# Patient Record
Sex: FEMALE | Race: ASIAN | NOT HISPANIC OR LATINO | URBAN - METROPOLITAN AREA
[De-identification: names, ages, dates, MRNs, and addresses within clinical notes are randomized per-mention and may not be internally consistent; named-entity substitution may affect disease eponyms.]

---

## 2014-11-07 RX ORDER — CARBIDOPA AND LEVODOPA 25; 100 MG/1; MG/1
2 TABLET ORAL
Qty: 0 | Refills: 0 | DISCHARGE
Start: 2014-11-07

## 2017-01-15 ENCOUNTER — EMERGENCY (EMERGENCY)
Facility: HOSPITAL | Age: 67
LOS: 1 days | Discharge: HOME | End: 2017-01-15
Attending: EMERGENCY MEDICINE | Admitting: EMERGENCY MEDICINE
Payer: COMMERCIAL

## 2017-01-15 VITALS
SYSTOLIC BLOOD PRESSURE: 156 MMHG | RESPIRATION RATE: 20 BRPM | OXYGEN SATURATION: 100 % | HEART RATE: 88 BPM | DIASTOLIC BLOOD PRESSURE: 86 MMHG

## 2017-01-15 VITALS
DIASTOLIC BLOOD PRESSURE: 76 MMHG | HEART RATE: 78 BPM | SYSTOLIC BLOOD PRESSURE: 130 MMHG | TEMPERATURE: 98 F | OXYGEN SATURATION: 99 % | RESPIRATION RATE: 16 BRPM

## 2017-01-15 DIAGNOSIS — R07.89 OTHER CHEST PAIN: ICD-10-CM

## 2017-01-15 PROCEDURE — 73562 X-RAY EXAM OF KNEE 3: CPT | Mod: 26,LT

## 2017-01-15 PROCEDURE — 71046 X-RAY EXAM CHEST 2 VIEWS: CPT

## 2017-01-15 PROCEDURE — 99284 EMERGENCY DEPT VISIT MOD MDM: CPT | Mod: 25

## 2017-01-15 PROCEDURE — 73562 X-RAY EXAM OF KNEE 3: CPT

## 2017-01-15 PROCEDURE — 93005 ELECTROCARDIOGRAM TRACING: CPT

## 2017-01-15 PROCEDURE — 71120 X-RAY EXAM BREASTBONE 2/>VWS: CPT

## 2017-01-15 PROCEDURE — 71120 X-RAY EXAM BREASTBONE 2/>VWS: CPT | Mod: 26

## 2017-01-15 PROCEDURE — 71020: CPT | Mod: 26

## 2017-01-15 PROCEDURE — 93010 ELECTROCARDIOGRAM REPORT: CPT

## 2017-01-15 RX ORDER — ACETAMINOPHEN 500 MG
975 TABLET ORAL ONCE
Qty: 0 | Refills: 0 | Status: COMPLETED | OUTPATIENT
Start: 2017-01-15 | End: 2017-01-15

## 2017-01-15 RX ORDER — OXYCODONE HYDROCHLORIDE 5 MG/1
1 TABLET ORAL
Qty: 10 | Refills: 0
Start: 2017-01-15 | End: 2017-01-18

## 2017-01-15 RX ADMIN — Medication 975 MILLIGRAM(S): at 15:36
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
## 2017-01-15 NOTE — ED PROVIDER NOTE - ATTENDING CONTRIBUTION TO CARE
. I performed a history and physical exam of the patient and discussed their management with the advanced care provider. I reviewed the advanced care provider's note and agree with the documented findings and plan of care. My medical decision making and objective findings are found above.
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
## 2017-01-15 NOTE — ED ADULT NURSE NOTE - CHPI ED SYMPTOMS NEG
no back pain/no dizziness/no cough/no vomiting/no syncope/no diaphoresis/no chills/no fever/no nausea

## 2017-01-15 NOTE — ED ADULT NURSE NOTE - PSH
H/O abdominal hysterectomy  fibroids - 1992  S/P brain surgery  with brain stimulators placed 8/22/2013

## 2017-01-15 NOTE — ED PROVIDER NOTE - PROGRESS NOTE DETAILS
Pt signed out to me. reexamined at bedside, EKG wnl, XRs reviewed. Her stimulator obscures part of sternal xray. suspicion for fx low. She has no ecchymosis over sternum. She does had mild lower thoracic pain without much tenderness I offered a CT chest and explained risks/benefits. Pt declined Her and family understand to return if SOB, increased pain, etc.

## 2017-01-15 NOTE — ED PROVIDER NOTE - INTERPRETATION
normal sinus rhythm, Normal axis, Normal AZ interval and QRS complex. There are no acute ischemic ST or T-wave changes.
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
## 2017-01-23 ENCOUNTER — OUTPATIENT (OUTPATIENT)
Dept: OUTPATIENT SERVICES | Facility: HOSPITAL | Age: 67
LOS: 1 days | End: 2017-01-23
Payer: COMMERCIAL

## 2017-01-23 ENCOUNTER — APPOINTMENT (OUTPATIENT)
Dept: CT IMAGING | Facility: IMAGING CENTER | Age: 67
End: 2017-01-23

## 2017-01-23 DIAGNOSIS — Z00.8 ENCOUNTER FOR OTHER GENERAL EXAMINATION: ICD-10-CM

## 2017-01-23 PROCEDURE — 71250 CT THORAX DX C-: CPT

## 2018-06-18 ENCOUNTER — APPOINTMENT (OUTPATIENT)
Dept: DERMATOLOGY | Facility: CLINIC | Age: 68
End: 2018-06-18
Payer: COMMERCIAL

## 2018-06-18 ENCOUNTER — LABORATORY RESULT (OUTPATIENT)
Age: 68
End: 2018-06-18

## 2018-06-18 VITALS
SYSTOLIC BLOOD PRESSURE: 140 MMHG | BODY MASS INDEX: 28.16 KG/M2 | WEIGHT: 153 LBS | DIASTOLIC BLOOD PRESSURE: 80 MMHG | HEIGHT: 62 IN

## 2018-06-18 DIAGNOSIS — Z86.39 PERSONAL HISTORY OF OTHER ENDOCRINE, NUTRITIONAL AND METABOLIC DISEASE: ICD-10-CM

## 2018-06-18 DIAGNOSIS — Z87.09 PERSONAL HISTORY OF OTHER DISEASES OF THE RESPIRATORY SYSTEM: ICD-10-CM

## 2018-06-18 DIAGNOSIS — D48.5 NEOPLASM OF UNCERTAIN BEHAVIOR OF SKIN: ICD-10-CM

## 2018-06-18 DIAGNOSIS — Z87.39 PERSONAL HISTORY OF OTHER DISEASES OF THE MUSCULOSKELETAL SYSTEM AND CONNECTIVE TISSUE: ICD-10-CM

## 2018-06-18 DIAGNOSIS — Z86.69 PERSONAL HISTORY OF OTHER DISEASES OF THE NERVOUS SYSTEM AND SENSE ORGANS: ICD-10-CM

## 2018-06-18 DIAGNOSIS — L30.9 DERMATITIS, UNSPECIFIED: ICD-10-CM

## 2018-06-18 PROCEDURE — 11100 BX SKIN SUBCUTANEOUS&/MUCOUS MEMBRANE 1 LESION: CPT

## 2018-06-18 PROCEDURE — 99203 OFFICE O/P NEW LOW 30 MIN: CPT | Mod: 25

## 2018-06-18 RX ORDER — RISEDRONATE SODIUM 150 MG/1
TABLET, FILM COATED ORAL
Refills: 0 | Status: ACTIVE | COMMUNITY

## 2018-06-18 RX ORDER — AMANTADINE HYDROCHLORIDE 100 MG/1
CAPSULE ORAL
Refills: 0 | Status: ACTIVE | COMMUNITY

## 2018-06-18 RX ORDER — LEVOTHYROXINE SODIUM 0.17 MG/1
TABLET ORAL
Refills: 0 | Status: ACTIVE | COMMUNITY

## 2018-06-18 RX ORDER — CARBIDOPA AND LEVODOPA 25; 250 MG/1; MG/1
TABLET ORAL
Refills: 0 | Status: ACTIVE | COMMUNITY

## 2018-06-19 PROBLEM — L30.9 DERMATITIS: Status: ACTIVE | Noted: 2018-06-18

## 2018-06-25 ENCOUNTER — APPOINTMENT (OUTPATIENT)
Dept: DERMATOLOGY | Facility: CLINIC | Age: 68
End: 2018-06-25
Payer: COMMERCIAL

## 2018-06-25 VITALS — SYSTOLIC BLOOD PRESSURE: 138 MMHG | DIASTOLIC BLOOD PRESSURE: 80 MMHG

## 2018-06-25 DIAGNOSIS — L53.8 OTHER SPECIFIED ERYTHEMATOUS CONDITIONS: ICD-10-CM

## 2018-06-25 DIAGNOSIS — Z48.02 ENCOUNTER FOR REMOVAL OF SUTURES: ICD-10-CM

## 2018-06-25 PROCEDURE — 99213 OFFICE O/P EST LOW 20 MIN: CPT

## 2018-06-25 RX ORDER — HYDROCORTISONE 25 MG/G
2.5 CREAM TOPICAL
Qty: 1 | Refills: 2 | Status: ACTIVE | COMMUNITY
Start: 2018-06-25 | End: 1900-01-01

## 2018-08-27 ENCOUNTER — APPOINTMENT (OUTPATIENT)
Dept: DERMATOLOGY | Facility: CLINIC | Age: 68
End: 2018-08-27

## 2020-11-10 NOTE — ED PROVIDER NOTE - CPE EDP CARDIAC NORM
normal... Admission Reconciliation is Not Complete  Discharge Reconciliation is Not Complete Admission Reconciliation is Completed  Discharge Reconciliation is Completed

## 2022-12-17 ENCOUNTER — INPATIENT (INPATIENT)
Facility: HOSPITAL | Age: 72
LOS: 0 days | Discharge: HOME | End: 2022-12-18
Attending: HOSPITALIST | Admitting: HOSPITALIST

## 2022-12-17 ENCOUNTER — TRANSCRIPTION ENCOUNTER (OUTPATIENT)
Age: 72
End: 2022-12-17

## 2022-12-17 VITALS — HEART RATE: 122 BPM | RESPIRATION RATE: 35 BRPM | OXYGEN SATURATION: 91 %

## 2022-12-17 DIAGNOSIS — Z29.9 ENCOUNTER FOR PROPHYLACTIC MEASURES, UNSPECIFIED: ICD-10-CM

## 2022-12-17 DIAGNOSIS — H10.9 UNSPECIFIED CONJUNCTIVITIS: ICD-10-CM

## 2022-12-17 DIAGNOSIS — F33.1 MAJOR DEPRESSIVE DISORDER, RECURRENT, MODERATE: ICD-10-CM

## 2022-12-17 DIAGNOSIS — J45.901 UNSPECIFIED ASTHMA WITH (ACUTE) EXACERBATION: ICD-10-CM

## 2022-12-17 DIAGNOSIS — R06.02 SHORTNESS OF BREATH: ICD-10-CM

## 2022-12-17 DIAGNOSIS — E03.9 HYPOTHYROIDISM, UNSPECIFIED: ICD-10-CM

## 2022-12-17 DIAGNOSIS — G20 PARKINSON'S DISEASE: ICD-10-CM

## 2022-12-17 LAB
ALBUMIN SERPL ELPH-MCNC: 4.1 G/DL — SIGNIFICANT CHANGE UP (ref 3.3–5)
ALP SERPL-CCNC: 78 U/L — SIGNIFICANT CHANGE UP (ref 40–120)
ALT FLD-CCNC: 9 U/L — SIGNIFICANT CHANGE UP (ref 4–33)
ANION GAP SERPL CALC-SCNC: 18 MMOL/L — HIGH (ref 7–14)
AST SERPL-CCNC: 22 U/L — SIGNIFICANT CHANGE UP (ref 4–32)
B PERT DNA SPEC QL NAA+PROBE: SIGNIFICANT CHANGE UP
B PERT+PARAPERT DNA PNL SPEC NAA+PROBE: SIGNIFICANT CHANGE UP
BASE EXCESS BLDV CALC-SCNC: -2.6 MMOL/L — LOW (ref -2–3)
BASE EXCESS BLDV CALC-SCNC: -5.2 MMOL/L — LOW (ref -2–3)
BASOPHILS # BLD AUTO: 0.03 K/UL — SIGNIFICANT CHANGE UP (ref 0–0.2)
BASOPHILS NFR BLD AUTO: 0.3 % — SIGNIFICANT CHANGE UP (ref 0–2)
BILIRUB SERPL-MCNC: 0.3 MG/DL — SIGNIFICANT CHANGE UP (ref 0.2–1.2)
BLOOD GAS VENOUS COMPREHENSIVE RESULT: SIGNIFICANT CHANGE UP
BLOOD GAS VENOUS COMPREHENSIVE RESULT: SIGNIFICANT CHANGE UP
BORDETELLA PARAPERTUSSIS (RAPRVP): SIGNIFICANT CHANGE UP
BUN SERPL-MCNC: 11 MG/DL — SIGNIFICANT CHANGE UP (ref 7–23)
C PNEUM DNA SPEC QL NAA+PROBE: SIGNIFICANT CHANGE UP
CALCIUM SERPL-MCNC: 9.3 MG/DL — SIGNIFICANT CHANGE UP (ref 8.4–10.5)
CHLORIDE BLDV-SCNC: 101 MMOL/L — SIGNIFICANT CHANGE UP (ref 96–108)
CHLORIDE BLDV-SCNC: 105 MMOL/L — SIGNIFICANT CHANGE UP (ref 96–108)
CHLORIDE SERPL-SCNC: 99 MMOL/L — SIGNIFICANT CHANGE UP (ref 98–107)
CO2 BLDV-SCNC: 23 MMOL/L — SIGNIFICANT CHANGE UP (ref 22–26)
CO2 BLDV-SCNC: 24.5 MMOL/L — SIGNIFICANT CHANGE UP (ref 22–26)
CO2 SERPL-SCNC: 19 MMOL/L — LOW (ref 22–31)
CREAT SERPL-MCNC: 0.43 MG/DL — LOW (ref 0.5–1.3)
EGFR: 103 ML/MIN/1.73M2 — SIGNIFICANT CHANGE UP
EOSINOPHIL # BLD AUTO: 0 K/UL — SIGNIFICANT CHANGE UP (ref 0–0.5)
EOSINOPHIL NFR BLD AUTO: 0 % — SIGNIFICANT CHANGE UP (ref 0–6)
FLUAV SUBTYP SPEC NAA+PROBE: SIGNIFICANT CHANGE UP
FLUBV RNA SPEC QL NAA+PROBE: SIGNIFICANT CHANGE UP
GAS PNL BLDV: 135 MMOL/L — LOW (ref 136–145)
GAS PNL BLDV: 137 MMOL/L — SIGNIFICANT CHANGE UP (ref 136–145)
GAS PNL BLDV: SIGNIFICANT CHANGE UP
GAS PNL BLDV: SIGNIFICANT CHANGE UP
GLUCOSE BLDV-MCNC: 134 MG/DL — HIGH (ref 70–99)
GLUCOSE BLDV-MCNC: 184 MG/DL — HIGH (ref 70–99)
GLUCOSE SERPL-MCNC: 182 MG/DL — HIGH (ref 70–99)
HADV DNA SPEC QL NAA+PROBE: SIGNIFICANT CHANGE UP
HCO3 BLDV-SCNC: 22 MMOL/L — SIGNIFICANT CHANGE UP (ref 22–29)
HCO3 BLDV-SCNC: 23 MMOL/L — SIGNIFICANT CHANGE UP (ref 22–29)
HCOV 229E RNA SPEC QL NAA+PROBE: SIGNIFICANT CHANGE UP
HCOV HKU1 RNA SPEC QL NAA+PROBE: SIGNIFICANT CHANGE UP
HCOV NL63 RNA SPEC QL NAA+PROBE: SIGNIFICANT CHANGE UP
HCOV OC43 RNA SPEC QL NAA+PROBE: SIGNIFICANT CHANGE UP
HCT VFR BLD CALC: 37.8 % — SIGNIFICANT CHANGE UP (ref 34.5–45)
HCT VFR BLDA CALC: 35 % — SIGNIFICANT CHANGE UP (ref 34.5–46.5)
HCT VFR BLDA CALC: 38 % — SIGNIFICANT CHANGE UP (ref 34.5–46.5)
HGB BLD CALC-MCNC: 11.5 G/DL — SIGNIFICANT CHANGE UP (ref 11.5–15.5)
HGB BLD CALC-MCNC: 12.6 G/DL — SIGNIFICANT CHANGE UP (ref 11.5–15.5)
HGB BLD-MCNC: 12.2 G/DL — SIGNIFICANT CHANGE UP (ref 11.5–15.5)
HMPV RNA SPEC QL NAA+PROBE: SIGNIFICANT CHANGE UP
HPIV1 RNA SPEC QL NAA+PROBE: SIGNIFICANT CHANGE UP
HPIV2 RNA SPEC QL NAA+PROBE: SIGNIFICANT CHANGE UP
HPIV3 RNA SPEC QL NAA+PROBE: SIGNIFICANT CHANGE UP
HPIV4 RNA SPEC QL NAA+PROBE: SIGNIFICANT CHANGE UP
IANC: 6.82 K/UL — SIGNIFICANT CHANGE UP (ref 1.8–7.4)
IMM GRANULOCYTES NFR BLD AUTO: 0.3 % — SIGNIFICANT CHANGE UP (ref 0–0.9)
LACTATE BLDV-MCNC: 1.7 MMOL/L — SIGNIFICANT CHANGE UP (ref 0.5–2)
LACTATE BLDV-MCNC: 5.2 MMOL/L — CRITICAL HIGH (ref 0.5–2)
LYMPHOCYTES # BLD AUTO: 1.16 K/UL — SIGNIFICANT CHANGE UP (ref 1–3.3)
LYMPHOCYTES # BLD AUTO: 13.5 % — SIGNIFICANT CHANGE UP (ref 13–44)
M PNEUMO DNA SPEC QL NAA+PROBE: SIGNIFICANT CHANGE UP
MCHC RBC-ENTMCNC: 30 PG — SIGNIFICANT CHANGE UP (ref 27–34)
MCHC RBC-ENTMCNC: 32.3 GM/DL — SIGNIFICANT CHANGE UP (ref 32–36)
MCV RBC AUTO: 92.9 FL — SIGNIFICANT CHANGE UP (ref 80–100)
MONOCYTES # BLD AUTO: 0.55 K/UL — SIGNIFICANT CHANGE UP (ref 0–0.9)
MONOCYTES NFR BLD AUTO: 6.4 % — SIGNIFICANT CHANGE UP (ref 2–14)
NEUTROPHILS # BLD AUTO: 6.82 K/UL — SIGNIFICANT CHANGE UP (ref 1.8–7.4)
NEUTROPHILS NFR BLD AUTO: 79.5 % — HIGH (ref 43–77)
NRBC # BLD: 0 /100 WBCS — SIGNIFICANT CHANGE UP (ref 0–0)
NRBC # FLD: 0 K/UL — SIGNIFICANT CHANGE UP (ref 0–0)
PCO2 BLDV: 43 MMHG — HIGH (ref 39–42)
PCO2 BLDV: 46 MMHG — HIGH (ref 39–42)
PH BLDV: 7.28 — LOW (ref 7.32–7.43)
PH BLDV: 7.34 — SIGNIFICANT CHANGE UP (ref 7.32–7.43)
PLATELET # BLD AUTO: 292 K/UL — SIGNIFICANT CHANGE UP (ref 150–400)
PO2 BLDV: 64 MMHG — SIGNIFICANT CHANGE UP
PO2 BLDV: 69 MMHG — SIGNIFICANT CHANGE UP
POTASSIUM BLDV-SCNC: 3.2 MMOL/L — LOW (ref 3.5–5.1)
POTASSIUM BLDV-SCNC: 4.3 MMOL/L — SIGNIFICANT CHANGE UP (ref 3.5–5.1)
POTASSIUM SERPL-MCNC: 4.1 MMOL/L — SIGNIFICANT CHANGE UP (ref 3.5–5.3)
POTASSIUM SERPL-SCNC: 4.1 MMOL/L — SIGNIFICANT CHANGE UP (ref 3.5–5.3)
PROT SERPL-MCNC: 7.6 G/DL — SIGNIFICANT CHANGE UP (ref 6–8.3)
RAPID RVP RESULT: SIGNIFICANT CHANGE UP
RBC # BLD: 4.07 M/UL — SIGNIFICANT CHANGE UP (ref 3.8–5.2)
RBC # FLD: 12.6 % — SIGNIFICANT CHANGE UP (ref 10.3–14.5)
RSV RNA SPEC QL NAA+PROBE: SIGNIFICANT CHANGE UP
RV+EV RNA SPEC QL NAA+PROBE: SIGNIFICANT CHANGE UP
SAO2 % BLDV: 91.2 % — SIGNIFICANT CHANGE UP
SAO2 % BLDV: 94.3 % — SIGNIFICANT CHANGE UP
SARS-COV-2 RNA SPEC QL NAA+PROBE: SIGNIFICANT CHANGE UP
SODIUM SERPL-SCNC: 136 MMOL/L — SIGNIFICANT CHANGE UP (ref 135–145)
WBC # BLD: 8.59 K/UL — SIGNIFICANT CHANGE UP (ref 3.8–10.5)
WBC # FLD AUTO: 8.59 K/UL — SIGNIFICANT CHANGE UP (ref 3.8–10.5)

## 2022-12-17 PROCEDURE — 99222 1ST HOSP IP/OBS MODERATE 55: CPT

## 2022-12-17 PROCEDURE — 99223 1ST HOSP IP/OBS HIGH 75: CPT | Mod: GC

## 2022-12-17 PROCEDURE — 99291 CRITICAL CARE FIRST HOUR: CPT

## 2022-12-17 PROCEDURE — 71045 X-RAY EXAM CHEST 1 VIEW: CPT | Mod: 26

## 2022-12-17 RX ORDER — CARBIDOPA AND LEVODOPA 25; 100 MG/1; MG/1
1 TABLET ORAL
Refills: 0 | Status: DISCONTINUED | OUTPATIENT
Start: 2022-12-17 | End: 2022-12-18

## 2022-12-17 RX ORDER — IPRATROPIUM/ALBUTEROL SULFATE 18-103MCG
3 AEROSOL WITH ADAPTER (GRAM) INHALATION ONCE
Refills: 0 | Status: COMPLETED | OUTPATIENT
Start: 2022-12-17 | End: 2022-12-17

## 2022-12-17 RX ORDER — RASAGILINE 0.5 MG/1
1 TABLET ORAL DAILY
Refills: 0 | Status: DISCONTINUED | OUTPATIENT
Start: 2022-12-17 | End: 2022-12-18

## 2022-12-17 RX ORDER — LEVOTHYROXINE SODIUM 125 MCG
100 TABLET ORAL DAILY
Refills: 0 | Status: DISCONTINUED | OUTPATIENT
Start: 2022-12-17 | End: 2022-12-18

## 2022-12-17 RX ORDER — LORATADINE 10 MG/1
10 TABLET ORAL DAILY
Refills: 0 | Status: DISCONTINUED | OUTPATIENT
Start: 2022-12-17 | End: 2022-12-18

## 2022-12-17 RX ORDER — QUETIAPINE FUMARATE 200 MG/1
25 TABLET, FILM COATED ORAL AT BEDTIME
Refills: 0 | Status: DISCONTINUED | OUTPATIENT
Start: 2022-12-17 | End: 2022-12-18

## 2022-12-17 RX ORDER — IPRATROPIUM/ALBUTEROL SULFATE 18-103MCG
3 AEROSOL WITH ADAPTER (GRAM) INHALATION EVERY 6 HOURS
Refills: 0 | Status: DISCONTINUED | OUTPATIENT
Start: 2022-12-17 | End: 2022-12-18

## 2022-12-17 RX ORDER — ERYTHROMYCIN BASE 5 MG/GRAM
1 OINTMENT (GRAM) OPHTHALMIC (EYE) THREE TIMES A DAY
Refills: 0 | Status: DISCONTINUED | OUTPATIENT
Start: 2022-12-17 | End: 2022-12-18

## 2022-12-17 RX ORDER — SIMVASTATIN 20 MG/1
40 TABLET, FILM COATED ORAL AT BEDTIME
Refills: 0 | Status: DISCONTINUED | OUTPATIENT
Start: 2022-12-17 | End: 2022-12-18

## 2022-12-17 RX ORDER — ALBUTEROL 90 UG/1
2 AEROSOL, METERED ORAL EVERY 6 HOURS
Refills: 0 | Status: DISCONTINUED | OUTPATIENT
Start: 2022-12-17 | End: 2022-12-18

## 2022-12-17 RX ORDER — MAGNESIUM SULFATE 500 MG/ML
2 VIAL (ML) INJECTION ONCE
Refills: 0 | Status: COMPLETED | OUTPATIENT
Start: 2022-12-17 | End: 2022-12-17

## 2022-12-17 RX ORDER — ENOXAPARIN SODIUM 100 MG/ML
40 INJECTION SUBCUTANEOUS EVERY 24 HOURS
Refills: 0 | Status: DISCONTINUED | OUTPATIENT
Start: 2022-12-17 | End: 2022-12-18

## 2022-12-17 RX ORDER — AMANTADINE HCL 100 MG
100 CAPSULE ORAL
Refills: 0 | Status: DISCONTINUED | OUTPATIENT
Start: 2022-12-17 | End: 2022-12-18

## 2022-12-17 RX ADMIN — QUETIAPINE FUMARATE 25 MILLIGRAM(S): 200 TABLET, FILM COATED ORAL at 22:10

## 2022-12-17 RX ADMIN — Medication 1 APPLICATION(S): at 22:09

## 2022-12-17 RX ADMIN — SIMVASTATIN 40 MILLIGRAM(S): 20 TABLET, FILM COATED ORAL at 22:10

## 2022-12-17 RX ADMIN — Medication 3 MILLILITER(S): at 13:22

## 2022-12-17 RX ADMIN — Medication 125 MILLIGRAM(S): at 12:53

## 2022-12-17 RX ADMIN — Medication 150 GRAM(S): at 12:53

## 2022-12-17 RX ADMIN — Medication 3 MILLILITER(S): at 13:23

## 2022-12-17 RX ADMIN — CARBIDOPA AND LEVODOPA 1 TABLET(S): 25; 100 TABLET ORAL at 19:56

## 2022-12-17 RX ADMIN — Medication 200 MILLIGRAM(S): at 15:38

## 2022-12-17 RX ADMIN — Medication 3 MILLILITER(S): at 22:53

## 2022-12-17 NOTE — ED ADULT NURSE NOTE - OBJECTIVE STATEMENT
pt recevied in TrB. pt A&Ox4. pt c/o worsening SOB over last couple of days. PMHx of asthma. pt tachypneic, restless, arrives to room in NRB mask. audible wheezing noted. O2 sat 97% on NRB, pt receiving duonebs as ordered.  pt placed on BiPap by respiratory, RR improved. pt calm tolerating BiPap, pt able to speak in full sentences. redness noted to both eyes. as per family pt has had cough for past couple days. 20g placed in L AC, 18g placed in R AC, labs drawn and sent, covid swab sent. medicated as per MD orders, 125mg of solumderol IVP and 2g magnesium IVPB. pt sitting in stretcher awaiting XRay. family at bedside.

## 2022-12-17 NOTE — DISCHARGE NOTE PROVIDER - CARE PROVIDER_API CALL
Flavia Butler)  Family Medicine  [address] [id number]  Phone: [phone]  Established Patient  Follow Up Time: 1 week

## 2022-12-17 NOTE — H&P ADULT - NSHPLABSRESULTS_GEN_ALL_CORE
Personally reviewed labs, imaging, ekg                           12.2   8.59  )-----------( 292      ( 17 Dec 2022 12:50 )             37.8       12-17    136  |  99  |  11  ----------------------------<  182<H>  4.1   |  19<L>  |  0.43<L>    Ca    9.3      17 Dec 2022 12:50    TPro  7.6  /  Alb  4.1  /  TBili  0.3  /  DBili  x   /  AST  22  /  ALT  9   /  AlkPhos  78  12-17          < from: Xray Chest 1 View-PORTABLE IMMEDIATE (Xray Chest 1 View-PORTABLE IMMEDIATE .) (12.17.22 @ 13:19) >    FINDINGS:  Electronic generators overlie bilateral mid hemithoraces with electrode   wire leads extending bilaterally cephalad beyond imaged field of view   probably representing deep brain stimulators.  Clear lungs. No pleural effusions or pneumothorax.  Heart size inaccurately assessed on the projection.  Trachea midline.  No acute bony abnormality.    IMPRESSION:  No focal consolidation.    < end of copied text >

## 2022-12-17 NOTE — ED PROVIDER NOTE - PROGRESS NOTE DETAILS
Donte: pt treated with 3 b2b nebs, mag, steroids, started on bipap, now appears much more comfortable, speaking complete sentences. [facility] Patient able to tolerate being off bipap

## 2022-12-17 NOTE — DISCHARGE NOTE PROVIDER - NSDCMRMEDTOKEN_GEN_ALL_CORE_FT
amantadine 100 mg oral capsule:  orally 3 times a day  amLODIPine 5 mg oral tablet: 1 tab(s) orally once a day  carbidopa-levodopa 25 mg-100 mg oral tablet:  orally five times daily  carbidopa-levodopa 25 mg-100 mg oral tablet, extended release: 1 tab(s) orally 2 times a day  cholecalciferol 50 mcg (2000 intl units) oral tablet: 1 tab(s) orally once a day  levothyroxine 100 mcg (0.1 mg) oral capsule: 1 cap(s) orally once a day am  QUEtiapine 25 mg oral tablet: 1 tab(s) orally once a day (at bedtime)  rasagiline 1 mg oral tablet: 1 tab(s) orally once a day  risedronate 150 mg oral tablet: 1 tab(s) orally once a month  simvastatin 40 mg oral tablet: 1 tab(s) orally once a day (at bedtime)   amantadine 100 mg oral capsule:  orally 3 times a day  amLODIPine 5 mg oral tablet: 1 tab(s) orally once a day  carbidopa-levodopa 25 mg-100 mg oral tablet:  orally five times daily  carbidopa-levodopa 25 mg-100 mg oral tablet, extended release: 1 tab orally every morning  2 tab(s) orally at bedtime     cholecalciferol 50 mcg (2000 intl units) oral tablet: 1 tab(s) orally once a day  levothyroxine 100 mcg (0.1 mg) oral capsule: 1 cap(s) orally once a day am  predniSONE 20 mg oral tablet: 2 tab(s) orally once a day    Please take starting 12/19-12/21  QUEtiapine 25 mg oral tablet: 1 tab(s) orally once a day (at bedtime)  rasagiline 1 mg oral tablet: 1 tab(s) orally once a day  risedronate 150 mg oral tablet: 1 tab(s) orally once a month  simvastatin 40 mg oral tablet: 1 tab(s) orally once a day (at bedtime)   albuterol 5 mg/mL (0.5%) inhalation solution: 0.5 milliliter(s) inhaled every 6 hours, As needed, Shortness of Breath and/or Wheezing  albuterol 90 mcg/inh inhalation powder: 2 puff(s) inhaled every 6 hours, As Needed -for shortness of breath and/or wheezing   amantadine 100 mg oral capsule:  orally 3 times a day  amLODIPine 5 mg oral tablet: 1 tab(s) orally once a day  carbidopa-levodopa 25 mg-100 mg oral tablet:  orally five times daily  carbidopa-levodopa 25 mg-100 mg oral tablet, extended release: 1 tab orally every morning  2 tab(s) orally at bedtime     cholecalciferol 50 mcg (2000 intl units) oral tablet: 1 tab(s) orally once a day  levothyroxine 100 mcg (0.1 mg) oral capsule: 1 cap(s) orally once a day am  Nebulizer machine :   predniSONE 20 mg oral tablet: 2 tab(s) orally once a day    Please take starting 12/19-12/21  QUEtiapine 25 mg oral tablet: 1 tab(s) orally once a day (at bedtime)  rasagiline 1 mg oral tablet: 1 tab(s) orally once a day  risedronate 150 mg oral tablet: 1 tab(s) orally once a month  simvastatin 40 mg oral tablet: 1 tab(s) orally once a day (at bedtime)   albuterol 5 mg/mL (0.5%) inhalation solution: 0.5 milliliter(s) inhaled every 6 hours, As needed, Shortness of Breath and/or Wheezing  albuterol 90 mcg/inh inhalation powder: 2 puff(s) inhaled every 6 hours, As Needed -for shortness of breath and/or wheezing   amantadine 100 mg oral capsule:  orally 3 times a day  amLODIPine 5 mg oral tablet: 1 tab(s) orally once a day  carbidopa-levodopa 25 mg-100 mg oral tablet: 1 tab(s) orally 5 times a day  carbidopa-levodopa 25 mg-100 mg oral tablet, extended release: 1 tab orally every morning  2 tab(s) orally at bedtime     cholecalciferol 50 mcg (2000 intl units) oral tablet: 1 tab(s) orally once a day  erythromycin 0.5% ophthalmic ointment: 1 application to each affected eye 3 times a day  levothyroxine 100 mcg (0.1 mg) oral capsule: 1 cap(s) orally once a day am  Nebulizer machine : To help with inhaled every 6 hours As needed    R06.02  predniSONE 20 mg oral tablet: 2 tab(s) orally once a day    Please take starting 12/19-12/21  QUEtiapine 25 mg oral tablet: 1 tab(s) orally once a day (at bedtime)  rasagiline 1 mg oral tablet: 1 tab(s) orally once a day  risedronate 150 mg oral tablet: 1 tab(s) orally once a month  simvastatin 40 mg oral tablet: 1 tab(s) orally once a day (at bedtime)

## 2022-12-17 NOTE — H&P ADULT - PROBLEM SELECTOR PLAN 4
DVT PPx: Lovenox 40  Diet: Regular  Dispo: Medically active    Code Status: Full Code pending further discussion with family - c/w levothyroxine     #HTN  On amlodipine at home   - will hold off until tomorrow to restart     #HLD  - c/w simvastatin

## 2022-12-17 NOTE — H&P ADULT - ATTENDING COMMENTS
71 yo female with pmhx of mild-moderate asthma (no piror intubations or hospitalizations), HTN, HLD, hypothyroidism, Parkinsons disease with Deep brain stimulator presents with complaints of shortness of breath and wheezing for the past 3 days. associated with productive cough. denies any fever/chills. no chest pain. no recent long flights or surgery,  was sick a week with mild fever. presented to urgent care yesterday, was given prednisone and azithromycin. symptoms worsened today.     afebrile, tachycardiac 122 and tachypneic to 30s, 91 on room air.   labs with  resp acidosis, no leukocytosis. lactate initially 5 but improved to 1.   CXR clear. RVP neg.    #Acute asthma exacerbation  #Acute hypercapnic resp failure   s/p solumedrol 125mg once, mag sulf, nebs and BIPAP  - cont with prednisone 40mg qd X4  - cont with nebs q6hr  - bipap prn  monitor vbg and pulse ox    #acute conjunctivitis   -cont with erythromycin eyedrop   - if worsening with visual impairment, would consult opthalmology    - antihistamine     rest as above

## 2022-12-17 NOTE — H&P ADULT - PROBLEM SELECTOR PLAN 2
On DBS, had it checked last year and now has rechargeable device   - c/w carbidopa-levidopa  - c/w home meds Bilateral erythematous, pruritic eyes   - started erythromycin ointment   - loratadine

## 2022-12-17 NOTE — ED PROVIDER NOTE - NS ED ROS FT
Constitutional: (-) fever (-) vomiting  Eyes/ENT: (-) vision changes, (-) hearing changes  Cardiovascular: (-) chest pain, (+) wheezing  Respiratory: (-) cough, (+) shortness of breath  Gastrointestinal: (-) vomiting, (-) diarrhea, (-) abdominal pain  : (-) dysuria   Musculoskeletal: (-) back pain  Integumentary: (-) rash, (-) edema  Neurological: (-)loc  Allergic/Immunologic: (-) pruritus  Endocrine: No history of thyroid disease

## 2022-12-17 NOTE — ED PROVIDER NOTE - ATTENDING CONTRIBUTION TO CARE
72 year old with acute shortness of breath brought in resp distress. seen immediately and given duonebs mag and steroids.  patient without significant improvement. started on bipap with good effect. labs for electrolyte abn, cbc for anemia cxr for pna or ptx flu swab admit

## 2022-12-17 NOTE — ED ADULT TRIAGE NOTE - CHIEF COMPLAINT QUOTE
Pt arrives ambulatory w/ walker to triage c/o SOB x1 day. Pt severely SOB upon arrival, + accessory muscle use, restless. Blood shot eyes noted. Pt placed on 15L NRB and taken directly to TR-B, charge RN aware. Unable to obtain full set of V/S. PMH: parkinson's, HLD, hypothyroid.

## 2022-12-17 NOTE — H&P ADULT - PROBLEM SELECTOR PLAN 5
DVT PPx: Lovenox 40  Diet: Regular  Dispo: Medically active    Code Status: Full Code pending further discussion with family

## 2022-12-17 NOTE — H&P ADULT - NSHPREVIEWOFSYSTEMS_GEN_ALL_CORE
Review of Systems:     CONSTITUTIONAL: No fever, weight loss  EYES: No eye pain, visual disturbances, or discharge  ENMT:  No difficulty hearing, tinnitus, vertigo; No sinus or throat pain  RESPIRATORY: No chills or hemoptysis  CARDIOVASCULAR: No chest pain, palpitations, dizziness, or leg swelling  GASTROINTESTINAL: No abdominal or epigastric pain. No nausea, vomiting, or hematemesis; No diarrhea or constipation. No melena or hematochezia.  GENITOURINARY: No dysuria, frequency, hematuria, or incontinence  NEUROLOGICAL: No headaches, memory loss, loss of strength, numbness, or tremors  SKIN: No itching, burning, rashes, or lesions   LYMPH NODES: No enlarged glands  ENDOCRINE: No heat or cold intolerance; No hair loss  MUSCULOSKELETAL: No joint pain or swelling; No muscle, back pain  PSYCHIATRIC: No depression, anxiety, mood swings, or difficulty sleeping  HEME/LYMPH: No easy bruising, or bleeding gums

## 2022-12-17 NOTE — ED ADULT NURSE NOTE - NSIMPLEMENTINTERV_GEN_ALL_ED
Implemented All Fall Risk Interventions:  [place] to call system. Call bell, personal items and telephone within reach. Instruct patient to call for assistance. Room bathroom lighting operational. Non-slip footwear when patient is off stretcher. Physically safe environment: no spills, clutter or unnecessary equipment. Stretcher in lowest position, wheels locked, appropriate side rails in place. Provide visual cue, wrist band, yellow gown, etc. Monitor gait and stability. Monitor for mental status changes and reorient to person, place, and time. Review medications for side effects contributing to fall risk. Reinforce activity limits and safety measures with patient and family.

## 2022-12-17 NOTE — CONSULT NOTE ADULT - SUBJECTIVE AND OBJECTIVE BOX
CHIEF COMPLAINT:    HPI:  72-year-old female with history of asthma, Parkinson's with deep brain stimulator, presents now with shortness of breath.  For the last few days patient has intermittent shortness of breath similar to her previous asthma attacks, went to her PMD who prescribed her a Z-Gael and erythromycin ointment for right eye conjunctivitis.  Today her symptoms got much worse.  She has been trying her albuterol nebulizer, but her  today noticed that it is .  She has had no fevers, no pains in her chest, no swelling in her legs.     In the ED, found to be tachypneic with increased work of breathing. Given nebulizers, steroids, magnesium and started on BiPAP with significant improvement in respiratory status. MICU consulted for new BiPAP.    PAST MEDICAL & SURGICAL HISTORY:  Parkinsons      Hyperlipidemia      Hypothyroid      OA (osteoarthritis)      S/P brain surgery  with brain stimulators placed 2013      H/O abdominal hysterectomy  fibroids -           FAMILY HISTORY:      SOCIAL HISTORY:  Lives with family    Allergies    Ceclor (Hives)  trihexyphenidyl (Hives)    Intolerances        HOME MEDICATIONS:    REVIEW OF SYSTEMS:  +improving SOB, wheezing  [ ] All other systems negative  [ ] Unable to assess ROS because ________    OBJECTIVE:  ICU Vital Signs Last 24 Hrs  T(C): --  T(F): --  HR: 104 (17 Dec 2022 12:59) (104 - 122)  BP: 107/53 (17 Dec 2022 12:59) (107/53 - 107/53)  BP(mean): --  ABP: --  ABP(mean): --  RR: 35 (17 Dec 2022 12:59) (35 - 35)  SpO2: 100% (17 Dec 2022 12:59) (91% - 100%)    O2 Parameters below as of 17 Dec 2022 12:59  Patient On (Oxygen Delivery Method): BiPAP/CPAP              CAPILLARY BLOOD GLUCOSE        PHYSICAL EXAM:  T(C): --  HR: 104 (22 @ 12:59) (104 - 122)  BP: 107/53 (22 @ 12:59) (107/53 - 107/53)  RR: 35 (22 @ 12:59) (35 - 35)  SpO2: 100% (22 @ 12:59) (91% - 100%)  GENERAL: On BiPAP, NAD, well-developed  HEAD:  Atraumatic, Normocephalic  EYES: EOMI, conjunctiva and sclera clear  NECK: Supple  CHEST/LUNG: Minimal wheezing bilaterally  HEART: Tachycardic, regular rhythm.  ABDOMEN: Soft, Nontender, Nondistended  EXTREMITIES: No clubbing, cyanosis, or edema  PSYCH: AAOx3  NEUROLOGY: non-focal  SKIN: No rashes or lesions    HOSPITAL MEDICATIONS:  MEDICATIONS  (STANDING):    MEDICATIONS  (PRN):      LABS:                        12.2   8.59  )-----------( 292      ( 17 Dec 2022 12:50 )             37.8         136  |  99  |  11  ----------------------------<  182<H>  4.1   |  19<L>  |  0.43<L>    Ca    9.3      17 Dec 2022 12:50    TPro  7.6  /  Alb  4.1  /  TBili  0.3  /  DBili  x   /  AST  22  /  ALT  9   /  AlkPhos  78            Venous Blood Gas:   @ 12:50  7.28/46/64/22/91.2  VBG Lactate: 5.2      MICROBIOLOGY:     RADIOLOGY:  [ ] Reviewed and interpreted by me    EKG:

## 2022-12-17 NOTE — H&P ADULT - PROBLEM SELECTOR PLAN 3
- c/w levothyroxine     #HTN  On amlodipine at home   - will hold off until tomorrow to restart     #HLD  - c/w simvastatin On DBS, had it checked last year and now has rechargeable device   - c/w carbidopa-levidopa  - c/w home meds

## 2022-12-17 NOTE — ED PROVIDER NOTE - CLINICAL SUMMARY MEDICAL DECISION MAKING FREE TEXT BOX
Patient with a history of asthma comes in with shortness of breath that feels like her similar previous asthma attacks, she has an albuterol nebulizer she can try at home but it is .  She has not had any fevers or chest pain, there is no lower extremity edema.  Her exam is notable for marked tachypnea with work of breathing and accessory muscle use.  She is not hypoxic.  We will treat with nebs, steroids, magnesium start BiPAP monitor carefully for improvement in symptoms.  Will need admission. XR to r/o alertnate etiology, no indication for abx at this time.

## 2022-12-17 NOTE — DISCHARGE NOTE PROVIDER - NSDCFUADDAPPT_GEN_ALL_CORE_FT
Please follow up with your primary care doctor to re-evaluate your asthma. Please continue to take a short course of prednisone 40mg (sent to your pharmacy requested).  Please follow up with your primary care doctor to re-evaluate your asthma. Please continue to take a short course of prednisone 40mg (sent to your pharmacy requested).     Albuterol inhaler was sent to your pharmacy. You were given a script for nebulizer machine and albuterol solution which you can use instead of the inhaler.

## 2022-12-17 NOTE — DISCHARGE NOTE PROVIDER - HOSPITAL COURSE
72-year-old female with history of asthma, hypothyroidism, HTN, HLD Parkinson's with deep brain stimulator, presents now with shortness of breath secondary to asthma exacerbation.    Hospital course:  /35, 91% on RA, tachypnea to 34. CXR clear and RVP negative. Started on nebs, solumedrol, mag, BiPAP with improvement in symptoms. Initial gas without significant CO2 retention (pCO2 46) but improvement on next VBG. Patient was continued on a course of PO steroids, no antibiotics. Given erythromycin ointment for conjunctivitis. 72-year-old female with history of asthma, hypothyroidism, HTN, HLD Parkinson's with deep brain stimulator, presents now with shortness of breath secondary to asthma exacerbation.    Hospital course:  /35, 91% on RA, tachypnea to 34. CXR clear and RVP negative. Started on nebs, solumedrol, mag, BiPAP with improvement in symptoms. Initial gas without significant CO2 retention (pCO2 46) but improvement on next VBG. Patient was continued on a course of PO steroids, no antibiotics. Patient had increased work of breathing overnight for which she was given nebulizer treatment and improved. Given erythromycin ointment for conjunctivitis.     On day of discharge, patient without wheezing and off oxygen. Will be discharged home.

## 2022-12-17 NOTE — ED PROVIDER NOTE - NSICDXPASTSURGICALHX_GEN_ALL_CORE_FT
PAST SURGICAL HISTORY:  H/O abdominal hysterectomy fibroids - 1992    S/P brain surgery with brain stimulators placed 8/22/2013

## 2022-12-17 NOTE — ED PROVIDER NOTE - PHYSICAL EXAMINATION
Vitals: I have reviewed the patients vital signs  General: marked WOB, anxious appearing  HEENT: Atraumatic, normocephalic, airway patent  Eyes: EOMI, tracking appropriately  Neck: no tracheal deviation, no JVD  Chest/Lungs: no trauma, symmetric chest rise, RR in 40s, marked accessory muscle use and abd breathing, upper airway BS, lower lung wheezes.   Heart: skin and extremities well perfused, tachy rate, no LE edema  Neuro: A+Ox3, moving all extremities, CN grossly intact  MSK: strength at baseline in all extremities, no muscle wasting or atrophy  Skin: no cyanosis, no jaundice, no new emergent lesions

## 2022-12-17 NOTE — CONSULT NOTE ADULT - ASSESSMENT
#New BiPAP  - Patient presenting with tachypnea and increased work of breathing with history of asthma. Started on BiPAP with significant improvement in respiratory status.  - Currently in no respiratory distress, O2 sat 100% on BiPAP  - Given Duonebs, steroids, magnesium. Continue nebulizers and steroids  - Wean BiPAP as tolerated, monitor for signs of respiratory distress and/or worsening oxygenation     This patient does not currently require care in the MICU. Please call with any questions and reconsult as needed.    This note is incomplete until attestation by the MICU Attending.

## 2022-12-17 NOTE — ED ADULT NURSE REASSESSMENT NOTE - NS ED NURSE REASSESS COMMENT FT1
pt at baseline mental status, respirations even and unlabored, o2 sat 100% RA. pt has no complaints at this time, pt sitting in bed comfortable at this time. [facility] spot 11.
pt. remains A&Ox4, awake and resting. pt. offers no new complaints at this time. no acute distress noted. respirations even and unlabored. VS up to date at this time.
pt at baseline mental status, respirations even and tachypneic pt trialed off BiPap, O2 97% on RA. tolerating well. pt in no apparent distress at this time. VS as charted, bed in lowest pos, siderails up. pt admitted awaiting bed.

## 2022-12-17 NOTE — H&P ADULT - HISTORY OF PRESENT ILLNESS
72-year-old female with history of asthma, Parkinson's with deep brain stimulator, presents now with shortness of breath. Patient reports having a cold over the past week and has been noticing shortness of breath on exertion. Also notes a productive cough, with yellowish sputum. Denies any fevers or chills, dysuria, abdominal pain, constipation, diarrhea, N/V. Went to urgent care center yesterday and was prescribed azithromycin and prednisone (has been taking since yesterday). In addition, she had red, irritated eyes for which she was given erythromycin drops. Has not happened to her before and she is not on blood thinners. Denies leg swelling, chest pain, lightheadedness.     Patient denies ever smoking. Was previously on an albuterol rescue inhaler, however, has not used since last month and medication had . No daily inhaled steroid. Pt notes that her asthma usually gets worse when she is sick.    ED course:  122/35, 91% on RA  Started on nebs and BiPAP with improvement in symptoms  72-year-old female with history of asthma, hypothyroidism, HTN, HLD Parkinson's with deep brain stimulator, presents now with shortness of breath. Patient reports having a cold over the past week and has been noticing shortness of breath on exertion. Also notes a productive cough, with yellowish sputum. Denies any fevers or chills, dysuria, abdominal pain, constipation, diarrhea, N/V. Went to urgent care center yesterday and was prescribed azithromycin and prednisone (has been taking since yesterday). In addition, she had red, irritated eyes for which she was given erythromycin drops. Has not happened to her before and she is not on blood thinners. Denies leg swelling, chest pain, lightheadedness.     Patient denies ever smoking. Was previously on an albuterol rescue inhaler, however, has not used since last month and medication had . No daily inhaled steroid. Pt notes that her asthma usually gets worse when she is sick.    ED course:  122/35, 91% on RA, RR 34   Started on nebs and BiPAP with improvement in symptoms

## 2022-12-17 NOTE — DISCHARGE NOTE PROVIDER - ATTENDING DISCHARGE PHYSICAL EXAMINATION:
PHYSICAL EXAM:  Vital Signs Last 24 Hrs  T(C): 36.8 (18 Dec 2022 05:59), Max: 36.9 (17 Dec 2022 23:26)  T(F): 98.3 (18 Dec 2022 05:59), Max: 98.4 (17 Dec 2022 23:26)  HR: 100 (18 Dec 2022 10:55) (98 - 111)  BP: 132/68 (18 Dec 2022 05:59) (124/56 - 134/69)  BP(mean): --  RR: 22 (18 Dec 2022 05:59) (17 - 30)  SpO2: 95% (18 Dec 2022 10:55) (94% - 100%)    Parameters below as of 18 Dec 2022 10:55  Patient On (Oxygen Delivery Method): room air      CONSTITUTIONAL: NAD, well-developed, well-groomed  EYES: PERRLA; conjunctiva injected bilaterally  ENMT: Moist oral mucosa, no pharyngeal injection or exudates; normal dentition  NECK: Supple, no palpable masses; no thyromegaly  RESPIRATORY: Normal respiratory effort; lungs are clear to auscultation bilaterally  CARDIOVASCULAR: Regular rate and rhythm, normal S1 and S2, no murmur/rub/gallop; No lower extremity edema; Peripheral pulses are 2+ bilaterally  ABDOMEN: Nontender to palpation, normoactive bowel sounds, no rebound/guarding; No hepatosplenomegaly  MUSCULOSKELETAL:  Normal gait; no clubbing or cyanosis of digits; no joint swelling or tenderness to palpation  PSYCH: A+O to person, place, and time; affect appropriate  NEUROLOGY: CN 2-12 are intact and symmetric; no gross sensory deficits   SKIN: No rashes; no palpable lesions

## 2022-12-17 NOTE — DISCHARGE NOTE PROVIDER - NSDCCPTREATMENT_GEN_ALL_CORE_FT
PRINCIPAL PROCEDURE  Procedure: XR chest, 2 views  Findings and Treatment:   < end of copied text >  FINDINGS:  Electronic generators overlie bilateral mid hemithoraces with electrode   wire leads extending bilaterally cephalad beyond imaged field of view   probably representing deep brain stimulators.  Clear lungs. No pleural effusions or pneumothorax.  Heart size inaccurately assessed on the projection.  Trachea midline.  No acute bony abnormality.  IMPRESSION:  No focal consolidation.< from: Xray Chest 1 View-PORTABLE IMMEDIATE (Xray Chest 1 View-PORTABLE IMMEDIATE .) (12.17.22 @ 13:19) >

## 2022-12-17 NOTE — H&P ADULT - NSHPPHYSICALEXAM_GEN_ALL_CORE
LOS:     VITALS:   T(C): 36.5 (12-17-22 @ 15:18), Max: 36.5 (12-17-22 @ 15:18)  HR: 100 (12-17-22 @ 15:18) (100 - 122)  BP: 134/69 (12-17-22 @ 15:18) (107/53 - 134/69)  RR: 22 (12-17-22 @ 15:18) (22 - 35)  SpO2: 97% (12-17-22 @ 15:18) (91% - 100%)    GENERAL: NAD, lying in bed comfortably, off oxygen   HEAD:  Atraumatic, Normocephalic  EYES: EOMI, PERRLA, erythematous conjunctiva   ENT: Moist mucous membranes  NECK: Supple, No JVD  CHEST/LUNG: Clear to auscultation bilaterally; No rales, rhonchi, wheezing, or rubs. Unlabored respirations  HEART: Regular rate and rhythm; No murmurs, rubs, or gallops  ABDOMEN: BSx4; Soft, nontender, nondistended  EXTREMITIES:  2+ Peripheral Pulses, brisk capillary refill. No clubbing, cyanosis, or edema  NERVOUS SYSTEM:  A&Ox3, no focal deficits 5/5 strength   SKIN: No rashes or lesions

## 2022-12-17 NOTE — H&P ADULT - ASSESSMENT
2-year-old female with history of asthma, hypothyroidism, HTN, HLD Parkinson's with deep brain stimulator, presents now with shortness of breath concerning for asthma exacerbation.  72-year-old female with history of asthma, hypothyroidism, HTN, HLD Parkinson's with deep brain stimulator, presents now with shortness of breath concerning for asthma exacerbation.

## 2022-12-17 NOTE — DISCHARGE NOTE PROVIDER - NSDCCPCAREPLAN_GEN_ALL_CORE_FT
PRINCIPAL DISCHARGE DIAGNOSIS  Diagnosis: Acute asthma exacerbation  Assessment and Plan of Treatment: You were found to be breathing fast with wheezing when you came to the hospital. It is likely that a viral cold put more stress on your lungs that resulted in worsening asthma. You will be discharged on albuterol inhaler as well as a short course of oral steroids. Please take this medication as prescribed.   Please follow up with your primary care doctor to follow up on your asthma.

## 2022-12-17 NOTE — H&P ADULT - PROBLEM SELECTOR PLAN 1
Treated for presumed asthma exacerbation, started on pred and azithro at urgent care   CXR clear, RVP negative   S/p neb x3, mag, solumedrol 125mg in ED   Previously on BiPAP, now on RA   - continue with 4 days prednisone 40mg  - duoneb prn ordered

## 2022-12-17 NOTE — DISCHARGE NOTE PROVIDER - NSDCHC_MEDRECSTATUS_GEN_ALL_CORE
Admission Reconciliation is Completed  Discharge Reconciliation is Not Complete Admission Reconciliation is Completed  Discharge Reconciliation is Completed Improved

## 2022-12-18 ENCOUNTER — TRANSCRIPTION ENCOUNTER (OUTPATIENT)
Age: 72
End: 2022-12-18

## 2022-12-18 VITALS
SYSTOLIC BLOOD PRESSURE: 124 MMHG | OXYGEN SATURATION: 94 % | TEMPERATURE: 98 F | DIASTOLIC BLOOD PRESSURE: 64 MMHG | HEART RATE: 100 BPM | RESPIRATION RATE: 18 BRPM

## 2022-12-18 PROCEDURE — 99239 HOSP IP/OBS DSCHRG MGMT >30: CPT

## 2022-12-18 RX ORDER — CARBIDOPA AND LEVODOPA 25; 100 MG/1; MG/1
2 TABLET ORAL ONCE
Refills: 0 | Status: COMPLETED | OUTPATIENT
Start: 2022-12-18 | End: 2022-12-18

## 2022-12-18 RX ORDER — CHOLECALCIFEROL (VITAMIN D3) 125 MCG
1 CAPSULE ORAL
Qty: 0 | Refills: 0 | DISCHARGE

## 2022-12-18 RX ORDER — ALBUTEROL 90 UG/1
2.5 AEROSOL, METERED ORAL EVERY 6 HOURS
Refills: 0 | Status: DISCONTINUED | OUTPATIENT
Start: 2022-12-18 | End: 2022-12-18

## 2022-12-18 RX ORDER — CARBIDOPA AND LEVODOPA 25; 100 MG/1; MG/1
1 TABLET ORAL
Qty: 0 | Refills: 0 | DISCHARGE

## 2022-12-18 RX ORDER — AMLODIPINE BESYLATE 2.5 MG/1
1 TABLET ORAL
Qty: 0 | Refills: 0 | DISCHARGE

## 2022-12-18 RX ORDER — RISEDRONATE SODIUM 25.8; 4.2 MG/1; MG/1
1 TABLET, FILM COATED ORAL
Qty: 0 | Refills: 0 | DISCHARGE

## 2022-12-18 RX ORDER — ALBUTEROL 90 UG/1
0.5 AEROSOL, METERED ORAL
Qty: 60 | Refills: 0
Start: 2022-12-18 | End: 2023-01-16

## 2022-12-18 RX ORDER — CARBIDOPA AND LEVODOPA 25; 100 MG/1; MG/1
1 TABLET ORAL
Refills: 0 | Status: DISCONTINUED | OUTPATIENT
Start: 2022-12-18 | End: 2022-12-18

## 2022-12-18 RX ORDER — IPRATROPIUM/ALBUTEROL SULFATE 18-103MCG
3 AEROSOL WITH ADAPTER (GRAM) INHALATION EVERY 4 HOURS
Refills: 0 | Status: DISCONTINUED | OUTPATIENT
Start: 2022-12-18 | End: 2022-12-18

## 2022-12-18 RX ORDER — RASAGILINE 0.5 MG/1
1 TABLET ORAL
Qty: 0 | Refills: 0 | DISCHARGE

## 2022-12-18 RX ORDER — CHLORHEXIDINE GLUCONATE 213 G/1000ML
1 SOLUTION TOPICAL
Refills: 0 | Status: DISCONTINUED | OUTPATIENT
Start: 2022-12-18 | End: 2022-12-18

## 2022-12-18 RX ORDER — CARBIDOPA AND LEVODOPA 25; 100 MG/1; MG/1
2 TABLET ORAL AT BEDTIME
Refills: 0 | Status: DISCONTINUED | OUTPATIENT
Start: 2022-12-19 | End: 2022-12-18

## 2022-12-18 RX ORDER — IPRATROPIUM/ALBUTEROL SULFATE 18-103MCG
3 AEROSOL WITH ADAPTER (GRAM) INHALATION EVERY 8 HOURS
Refills: 0 | Status: DISCONTINUED | OUTPATIENT
Start: 2022-12-18 | End: 2022-12-18

## 2022-12-18 RX ORDER — ENOXAPARIN SODIUM 100 MG/ML
30 INJECTION SUBCUTANEOUS EVERY 24 HOURS
Refills: 0 | Status: DISCONTINUED | OUTPATIENT
Start: 2022-12-18 | End: 2022-12-18

## 2022-12-18 RX ORDER — ERYTHROMYCIN BASE 5 MG/GRAM
1 OINTMENT (GRAM) OPHTHALMIC (EYE)
Qty: 3.5 | Refills: 0
Start: 2022-12-18 | End: 2022-12-22

## 2022-12-18 RX ORDER — ALBUTEROL 90 UG/1
2 AEROSOL, METERED ORAL
Qty: 1 | Refills: 2
Start: 2022-12-18

## 2022-12-18 RX ORDER — IPRATROPIUM/ALBUTEROL SULFATE 18-103MCG
3 AEROSOL WITH ADAPTER (GRAM) INHALATION ONCE
Refills: 0 | Status: COMPLETED | OUTPATIENT
Start: 2022-12-18 | End: 2022-12-18

## 2022-12-18 RX ORDER — ENOXAPARIN SODIUM 100 MG/ML
40 INJECTION SUBCUTANEOUS EVERY 24 HOURS
Refills: 0 | Status: DISCONTINUED | OUTPATIENT
Start: 2022-12-18 | End: 2022-12-18

## 2022-12-18 RX ORDER — IPRATROPIUM/ALBUTEROL SULFATE 18-103MCG
3 AEROSOL WITH ADAPTER (GRAM) INHALATION EVERY 6 HOURS
Refills: 0 | Status: DISCONTINUED | OUTPATIENT
Start: 2022-12-18 | End: 2022-12-18

## 2022-12-18 RX ORDER — ALBUTEROL 90 UG/1
0.5 AEROSOL, METERED ORAL
Qty: 0 | Refills: 0 | DISCHARGE
Start: 2022-12-18

## 2022-12-18 RX ORDER — QUETIAPINE FUMARATE 200 MG/1
1 TABLET, FILM COATED ORAL
Qty: 0 | Refills: 0 | DISCHARGE

## 2022-12-18 RX ORDER — CARBIDOPA AND LEVODOPA 25; 100 MG/1; MG/1
1 TABLET ORAL DAILY
Refills: 0 | Status: DISCONTINUED | OUTPATIENT
Start: 2022-12-18 | End: 2022-12-18

## 2022-12-18 RX ADMIN — CARBIDOPA AND LEVODOPA 1 TABLET(S): 25; 100 TABLET ORAL at 00:07

## 2022-12-18 RX ADMIN — Medication 3 MILLILITER(S): at 02:49

## 2022-12-18 RX ADMIN — Medication 100 MICROGRAM(S): at 06:59

## 2022-12-18 RX ADMIN — Medication 1 APPLICATION(S): at 13:21

## 2022-12-18 RX ADMIN — CHLORHEXIDINE GLUCONATE 1 APPLICATION(S): 213 SOLUTION TOPICAL at 07:20

## 2022-12-18 RX ADMIN — Medication 100 MILLIGRAM(S): at 11:20

## 2022-12-18 RX ADMIN — CARBIDOPA AND LEVODOPA 2 TABLET(S): 25; 100 TABLET ORAL at 01:16

## 2022-12-18 RX ADMIN — Medication 40 MILLIGRAM(S): at 06:59

## 2022-12-18 RX ADMIN — CARBIDOPA AND LEVODOPA 1 TABLET(S): 25; 100 TABLET ORAL at 11:20

## 2022-12-18 RX ADMIN — Medication 1 APPLICATION(S): at 07:28

## 2022-12-18 RX ADMIN — ALBUTEROL 2.5 MILLIGRAM(S): 90 AEROSOL, METERED ORAL at 10:55

## 2022-12-18 RX ADMIN — CARBIDOPA AND LEVODOPA 1 TABLET(S): 25; 100 TABLET ORAL at 12:22

## 2022-12-18 RX ADMIN — LORATADINE 10 MILLIGRAM(S): 10 TABLET ORAL at 11:20

## 2022-12-18 RX ADMIN — RASAGILINE 1 MILLIGRAM(S): 0.5 TABLET ORAL at 07:03

## 2022-12-18 RX ADMIN — CARBIDOPA AND LEVODOPA 1 TABLET(S): 25; 100 TABLET ORAL at 06:58

## 2022-12-18 NOTE — PHYSICAL THERAPY INITIAL EVALUATION ADULT - PERTINENT HX OF CURRENT PROBLEM, REHAB EVAL
72-year-old female with history of asthma, hypothyroidism, HTN, HLD Parkinson's with deep brain stimulator, presents now with shortness of breath concerning for asthma exacerbation.

## 2022-12-18 NOTE — PHYSICAL THERAPY INITIAL EVALUATION ADULT - DIAGNOSIS, PT EVAL
Pt admitted for asthma exacerbation; pt presents with decreased strength, decreased balance, and decreased aerobic capacity/endurance.

## 2022-12-18 NOTE — PHYSICAL THERAPY INITIAL EVALUATION ADULT - GENERAL OBSERVATIONS, REHAB EVAL
Pt encountered in semisupine position, no distress, AxOx4, with +IV, and  @ bedside. Pt agreeable to participate in PT evaluation.

## 2022-12-18 NOTE — DISCHARGE NOTE NURSING/CASE MANAGEMENT/SOCIAL WORK - PATIENT PORTAL LINK FT
You can access the FollowMyHealth Patient Portal offered by [facility] by registering at the following website: [email or web address]. By joining [employer] [facility]s FollowMyHealth portal, you will also be able to view your health information using other applications (apps) compatible with our system.

## 2022-12-18 NOTE — PHYSICAL THERAPY INITIAL EVALUATION ADULT - ADDITIONAL COMMENTS
Pt reports that she lives in a private house with her  with 3 steps to enter; (+)bilateral  handrails; bedroom/bathroom is on the first floor. Prior to hospital admission pt was completely independent and used a rollator with ambulation. Pt denies any recent falls.    Pt left comfortable in bed, NAD, all lines intact, all precautions maintained, with call bell in reach,  @ bedside, and RN aware of PT evaluation.

## 2022-12-18 NOTE — PROGRESS NOTE ADULT - ATTENDING COMMENTS
73 yo female with pmhx of mild-moderate asthma (no piror intubations or hospitalizations), HTN, HLD, hypothyroidism, Parkinsons disease with Deep brain stimulator presents with complaints of shortness of breath and wheezing for 3 days    #Acute asthma exacerbation  #Acute hypercapnic resp failure  - cont with prednisone 40mg qd X4  - cont with nebs q6hr  - no longer requiring supplemental O2, ambulated with SpO2 maintained >94%    #acute conjunctivitis  - cont with erythromycin eyedrop  - if worsening with visual impairment, would consult opthalmology   - antihistamine     Okay for discharge to home now that she is no longer requiring supplemental O2. Can offer PRN antitussives and complete prednisone course on d/c.

## 2022-12-18 NOTE — PATIENT PROFILE ADULT - FALL HARM RISK - HARM RISK INTERVENTIONS
Assistance with ambulation/Assistance OOB with selected safe patient handling equipment/Communicate Risk of Fall with Harm to all staff/Discuss with provider need for PT consult/Monitor gait and stability/Provide patient with walking aids - walker, cane, crutches/Reinforce activity limits and safety measures with patient and family/Review medications for side effects contributing to fall risk/Sit up slowly, dangle for a short time, stand at bedside before walking/Tailored Fall Risk Interventions/Toileting schedule using arm’s reach rule for commode and bathroom/Use of alarms - bed, chair and/or voice tab/Visual Cue: Yellow wristband and red socks/Bed in lowest position, wheels locked, appropriate side rails in place/Call bell, personal items and telephone in reach/Instruct patient to call for assistance before getting out of bed or chair/Non-slip footwear when patient is out of bed/[place] to call system/Physically safe environment - no spills, clutter or unnecessary equipment/Purposeful Proactive Rounding/Room/bathroom lighting operational, light cord in reach

## 2022-12-18 NOTE — PROGRESS NOTE ADULT - SUBJECTIVE AND OBJECTIVE BOX
PATIENT: ALYSSIA SNYDER, MRN: [id number]    CHIEF COMPLAINT: Patient is a 72y old  Female who presents with a chief complaint of SOB (17 Dec 2022 20:52)      INTERVAL HISTORY/OVERNIGHT EVENTS: Patient overnight with increased WOB, wheezing. Satting 94% on RA but was put on oxygen for comfort, given neb treatment.       MEDICATIONS:  MEDICATIONS  (STANDING):  albuterol   0.5% 2.5 milliGRAM(s) Nebulizer every 6 hours  amantadine 100 milliGRAM(s) Oral two times a day  carbidopa/levodopa  25/100 1 Tablet(s) Oral four times a day  carbidopa/levodopa CR 25/100 1 Tablet(s) Oral daily  chlorhexidine 2% Cloths 1 Application(s) Topical <User Schedule>  enoxaparin Injectable 40 milliGRAM(s) SubCutaneous every 24 hours  erythromycin   Ointment 1 Application(s) Both EYES three times a day  levothyroxine 100 MICROGram(s) Oral daily  loratadine 10 milliGRAM(s) Oral daily  predniSONE   Tablet 40 milliGRAM(s) Oral daily  QUEtiapine 25 milliGRAM(s) Oral at bedtime  rasagiline Tablet 1 milliGRAM(s) Oral daily  simvastatin 40 milliGRAM(s) Oral at bedtime    MEDICATIONS  (PRN):      ALLERGIES: Allergies    Ceclor (Hives)  trihexyphenidyl (Hives)    Intolerances        OBJECTIVE:  ICU Vital Signs Last 24 Hrs  T(C): 36.9 (17 Dec 2022 23:26), Max: 36.9 (17 Dec 2022 23:26)  T(F): 98.4 (17 Dec 2022 23:26), Max: 98.4 (17 Dec 2022 23:26)  HR: 103 (18 Dec 2022 02:45) (100 - 122)  BP: 134/62 (18 Dec 2022 02:05) (107/53 - 134/69)  BP(mean): --  ABP: --  ABP(mean): --  RR: 22 (18 Dec 2022 02:45) (17 - 35)  SpO2: 100% (18 Dec 2022 02:45) (91% - 100%)    O2 Parameters below as of 18 Dec 2022 02:45  Patient On (Oxygen Delivery Method): nasal cannula  O2 Flow (L/min): 3          Adult Advanced Hemodynamics Last 24 Hrs  CVP(mm Hg): --  CVP(cm H2O): --  CO: --  CI: --  PA: --  PA(mean): --  PCWP: --  SVR: --  SVRI: --  PVR: --  PVRI: --  CAPILLARY BLOOD GLUCOSE        CAPILLARY BLOOD GLUCOSE        I&O's Summary    Daily Height in cm: 160.02 (17 Dec 2022 22:32)    Daily     PHYSICAL EXAMINATION:  General: Comfortable, no acute distress, cooperative with exam.  HEENT: PERRLA, EOMI, moist mucous membranes.  Respiratory: CTAB, normal respiratory effort, no coughing, wheezes, crackles, or rales.  CV: RRR, S1S2, no murmurs, rubs or gallops. No JVD. Distal pulses intact.  Abdominal: Soft, nontender, nondistended, no rebound or guarding, normal bowel sounds.  Neurology: AOx3, no focal neuro defects, SALAZAR x 4.  Extremities: No pitting edema, + Peripheral pulses.  Incisions:   Tubes:    LABS:                          12.2   8.59  )-----------( 292      ( 17 Dec 2022 12:50 )             37.8     12-17    136  |  99  |  11  ----------------------------<  182<H>  4.1   |  19<L>  |  0.43<L>    Ca    9.3      17 Dec 2022 12:50    TPro  7.6  /  Alb  4.1  /  TBili  0.3  /  DBili  x   /  AST  22  /  ALT  9   /  AlkPhos  78  12-17    LIVER FUNCTIONS - ( 17 Dec 2022 12:50 )  Alb: 4.1 g/dL / Pro: 7.6 g/dL / ALK PHOS: 78 U/L / ALT: 9 U/L / AST: 22 U/L / GGT: x                    PATIENT: ALYSSIA SNYDER, MRN: [id number]    CHIEF COMPLAINT: Patient is a 72y old  Female who presents with a chief complaint of SOB (17 Dec 2022 20:52)      INTERVAL HISTORY/OVERNIGHT EVENTS: Patient overnight with increased WOB, wheezing. Satting 94% on RA but was put on oxygen for comfort, given neb treatment. Currently comfortable in the room and not requiring oxygen. Has not received nebs since then. No chest pain or acute complaints.       MEDICATIONS:  MEDICATIONS  (STANDING):  albuterol   0.5% 2.5 milliGRAM(s) Nebulizer every 6 hours  amantadine 100 milliGRAM(s) Oral two times a day  carbidopa/levodopa  25/100 1 Tablet(s) Oral four times a day  carbidopa/levodopa CR 25/100 1 Tablet(s) Oral daily  chlorhexidine 2% Cloths 1 Application(s) Topical <User Schedule>  enoxaparin Injectable 40 milliGRAM(s) SubCutaneous every 24 hours  erythromycin   Ointment 1 Application(s) Both EYES three times a day  levothyroxine 100 MICROGram(s) Oral daily  loratadine 10 milliGRAM(s) Oral daily  predniSONE   Tablet 40 milliGRAM(s) Oral daily  QUEtiapine 25 milliGRAM(s) Oral at bedtime  rasagiline Tablet 1 milliGRAM(s) Oral daily  simvastatin 40 milliGRAM(s) Oral at bedtime    MEDICATIONS  (PRN):      ALLERGIES: Allergies    Ceclor (Hives)  trihexyphenidyl (Hives)    Intolerances        OBJECTIVE:  ICU Vital Signs Last 24 Hrs  T(C): 36.9 (17 Dec 2022 23:26), Max: 36.9 (17 Dec 2022 23:26)  T(F): 98.4 (17 Dec 2022 23:26), Max: 98.4 (17 Dec 2022 23:26)  HR: 103 (18 Dec 2022 02:45) (100 - 122)  BP: 134/62 (18 Dec 2022 02:05) (107/53 - 134/69)  BP(mean): --  ABP: --  ABP(mean): --  RR: 22 (18 Dec 2022 02:45) (17 - 35)  SpO2: 100% (18 Dec 2022 02:45) (91% - 100%)    O2 Parameters below as of 18 Dec 2022 02:45  Patient On (Oxygen Delivery Method): nasal cannula  O2 Flow (L/min): 3          Adult Advanced Hemodynamics Last 24 Hrs  CVP(mm Hg): --  CVP(cm H2O): --  CO: --  CI: --  PA: --  PA(mean): --  PCWP: --  SVR: --  SVRI: --  PVR: --  PVRI: --  CAPILLARY BLOOD GLUCOSE        CAPILLARY BLOOD GLUCOSE        I&O's Summary    Daily Height in cm: 160.02 (17 Dec 2022 22:32)    Daily     PHYSICAL EXAMINATION:  GENERAL: NAD, lying in bed comfortably, off oxygen   HEAD:  Atraumatic, Normocephalic  EYES: EOMI, PERRLA, improved erythematous conjunctiva   ENT: Moist mucous membranes  NECK: Supple, No JVD  CHEST/LUNG: Clear to auscultation bilaterally; No rales, rhonchi, wheezing, or rubs. Unlabored respirations  HEART: Regular rate and rhythm; No murmurs, rubs, or gallops  ABDOMEN: BSx4; Soft, nontender, nondistended  EXTREMITIES:  2+ Peripheral Pulses, brisk capillary refill. No clubbing, cyanosis, or edema  NERVOUS SYSTEM:  A&Ox3, no focal deficits 5/5 strength   SKIN: No rashes or lesions      LABS:                          12.2   8.59  )-----------( 292      ( 17 Dec 2022 12:50 )             37.8     12-17    136  |  99  |  11  ----------------------------<  182<H>  4.1   |  19<L>  |  0.43<L>    Ca    9.3      17 Dec 2022 12:50    TPro  7.6  /  Alb  4.1  /  TBili  0.3  /  DBili  x   /  AST  22  /  ALT  9   /  AlkPhos  78  12-17    LIVER FUNCTIONS - ( 17 Dec 2022 12:50 )  Alb: 4.1 g/dL / Pro: 7.6 g/dL / ALK PHOS: 78 U/L / ALT: 9 U/L / AST: 22 U/L / GGT: x

## 2022-12-18 NOTE — DISCHARGE NOTE NURSING/CASE MANAGEMENT/SOCIAL WORK - NSDCFUADDAPPT_GEN_ALL_CORE_FT
Please follow up with your primary care doctor to re-evaluate your asthma. Please continue to take a short course of prednisone 40mg (sent to your pharmacy requested).     Albuterol inhaler was sent to your pharmacy. You were given a script for nebulizer machine and albuterol solution which you can use instead of the inhaler.

## 2022-12-18 NOTE — PROGRESS NOTE ADULT - PROBLEM SELECTOR PLAN 1
Treated for presumed asthma exacerbation, started on pred and azithro at urgent care   CXR clear, RVP negative   S/p neb x3, mag, solumedrol 125mg in ED   Previously on BiPAP, now on RA   - continue with 4 days prednisone 40mg  - duoneb prn ordered Treated for presumed asthma exacerbation, started on pred and azithro at urgent care   CXR clear, RVP negative   S/p neb x3, mag, solumedrol 125mg in ED   Previously on BiPAP, now on RA   - continue with 4 days prednisone 40mg  - albuterol prn ordered

## 2022-12-18 NOTE — PROVIDER CONTACT NOTE (OTHER) - ASSESSMENT
O2 sat 94%, , wheezes heard on inspiration, coughing, pt restless and having involuntary limb movement

## 2022-12-18 NOTE — DISCHARGE NOTE NURSING/CASE MANAGEMENT/SOCIAL WORK - NSDCVIVACCINE_GEN_ALL_CORE_FT
pneumococcal polysaccharide PPV23; 06-Nov-2014 11:54; Siomara Melendez (RN); [employer] [id number]; IntraMuscular; Deltoid Left.; 0.5 milliLiter(s);   Tdap; 03-Sep-2014 13:16; Padmini Mcdaniel); [employer]; [id number]; IntraMuscular; Deltoid Left.; 0.5 milliLiter(s);   Tdap; 03-Sep-2014 13:19; Padmini Mcdaniel); [employer]; [id number]; IntraMuscular; Deltoid Left.; 0.5 milliLiter(s);

## 2022-12-18 NOTE — CHART NOTE - NSCHARTNOTEFT_GEN_A_CORE
Called to bedside for increased work of breathing.     O2: 93% on RA, HR: 110, BP: 129/57, RR: 28    PE:  General: In distress, legs restless  Respiratory: b/l inspiratory and expiratory wheeze, accessory muscle use  Cardiac: increased rate, regular rhythm.     Duonebs given, O2 sat 100% on NRB    Upon reassessment patient reports significant improvement in symptoms and has decreased work of breathing    Duonebs changed to q6 standing, and q8 PRN  Start NC     Adalid Benedict MD  PGY-1 Called to bedside for increased work of breathing.     O2: 93% on RA, HR: 110, BP: 129/57, RR: 28    PE:  General: In distress, legs restless  Respiratory: b/l inspiratory and expiratory wheeze, accessory muscle use. Audible wheeze without stethoscope.   Cardiac: increased rate, regular rhythm.     Duonebs given, O2 sat 100% on NRB    Upon reassessment patient reports significant improvement in symptoms and has decreased work of breathing, improvement in wheezing    Duonebs changed to q6 standing, and q8 PRN  Start NC     Adalid Benedict MD  PGY-1

## 2022-12-18 NOTE — PROGRESS NOTE ADULT - PROBLEM SELECTOR PLAN 4
- c/w levothyroxine     #HTN  On amlodipine at home   - will hold off until tomorrow to restart     #HLD  - c/w simvastatin - c/w levothyroxine     #HTN  On amlodipine at home   - can start amlodipine    #HLD  - c/w simvastatin

## 2022-12-18 NOTE — PHYSICAL THERAPY INITIAL EVALUATION ADULT - PATIENT PROFILE REVIEW, REHAB EVAL
yes No formal Activity Order in the computer; spoke with DAMI Macdonald prior to PT evaluation--> Pt OK for PT consult/OOB activity./yes

## 2022-12-18 NOTE — PATIENT PROFILE ADULT - FUNCTIONAL ASSESSMENT - BASIC MOBILITY 6.
3-calculated by average/Not able to assess (calculate score using [facility] averaging method)

## 2022-12-30 ENCOUNTER — APPOINTMENT (OUTPATIENT)
Dept: PULMONOLOGY | Facility: CLINIC | Age: 72
End: 2022-12-30
Payer: MEDICARE

## 2022-12-30 VITALS
HEIGHT: 60.5 IN | SYSTOLIC BLOOD PRESSURE: 112 MMHG | OXYGEN SATURATION: 96 % | TEMPERATURE: 97.2 F | WEIGHT: 150 LBS | DIASTOLIC BLOOD PRESSURE: 60 MMHG | BODY MASS INDEX: 28.69 KG/M2 | HEART RATE: 92 BPM

## 2022-12-30 DIAGNOSIS — G20 PARKINSON'S DISEASE: ICD-10-CM

## 2022-12-30 DIAGNOSIS — J45.909 UNSPECIFIED ASTHMA, UNCOMPLICATED: ICD-10-CM

## 2022-12-30 DIAGNOSIS — R13.12 DYSPHAGIA, OROPHARYNGEAL PHASE: ICD-10-CM

## 2022-12-30 DIAGNOSIS — R13.10 DYSPHAGIA, UNSPECIFIED: ICD-10-CM

## 2022-12-30 DIAGNOSIS — J45.901 UNSPECIFIED ASTHMA WITH (ACUTE) EXACERBATION: ICD-10-CM

## 2022-12-30 PROCEDURE — 99214 OFFICE O/P EST MOD 30 MIN: CPT

## 2022-12-30 PROCEDURE — 99204 OFFICE O/P NEW MOD 45 MIN: CPT

## 2022-12-30 RX ORDER — FLUTICASONE PROPIONATE AND SALMETEROL 250; 50 UG/1; UG/1
250-50 POWDER RESPIRATORY (INHALATION)
Qty: 1 | Refills: 1 | Status: DISCONTINUED | COMMUNITY
Start: 2022-12-30 | End: 2022-12-30

## 2022-12-30 NOTE — HISTORY OF PRESENT ILLNESS
[Never] : never [TextBox_4] : Patient is a 72 year old female Hx asthma, Parkinson's disease s/p DBS, presents to [facility] s/p recent hospitalization for acute asthma exacerbation.  Patient reports she has not experienced asthma symptoms in some time.  She reports she began to wheeze and was taken to Urgent Care.  She did not improved and went to ER.  Patient admitted for two days and treated for  asthma exacerbation.  Patient reports she is feeling better.  She is not using daily medication at this time.  She is here for these symptoms accompanied by her .

## 2022-12-30 NOTE — ASSESSMENT
[FreeTextEntry1] : 72 year old female Hx asthma, Parkinson's, HTN presents for evaluation s/p recent hospitalization for asthma exacerbation, possible aspiration due to dysphagia\par \par Obtain PFT\par Speech and Swallow evaluation\par Trial of Advair 250-50 1 puff BID\par \par Follow up pending studies

## 2023-01-02 RX ORDER — FLUTICASONE PROPIONATE AND SALMETEROL 250; 50 UG/1; UG/1
250-50 POWDER RESPIRATORY (INHALATION)
Qty: 1 | Refills: 0 | Status: ACTIVE | COMMUNITY
Start: 2023-01-02 | End: 1900-01-01

## 2023-01-03 RX ORDER — FLUTICASONE PROPIONATE AND SALMETEROL 50; 250 UG/1; UG/1
250-50 POWDER RESPIRATORY (INHALATION)
Qty: 1 | Refills: 0 | Status: ACTIVE | COMMUNITY
Start: 2022-12-30 | End: 1900-01-01

## 2023-01-13 ENCOUNTER — APPOINTMENT (OUTPATIENT)
Dept: OTOLARYNGOLOGY | Facility: CLINIC | Age: 73
End: 2023-01-13
Payer: MEDICARE

## 2023-01-13 PROCEDURE — 92610 EVALUATE SWALLOWING FUNCTION: CPT | Mod: GN

## 2023-01-13 NOTE — ASSESSMENT
[FreeTextEntry1] : CLINICAL DYSPHAGIA EVALUATION \par \par Date of Report: 2023 \par Date of Evaluation: 2023 \par Patient Name: Ad Reyez  \par :  1950 \par Primary Diagnosis:  Dysphagia \par Treatment Diagnosis:  Dysphagia \par Referring Physician:   Dr. Cassidy Arevalo  \par \par REASON FOR REFERRAL \par Ad Reyez is a 72 year old female with complaint of difficulty swallowing and was seen today for a comprehensive Clinical Dysphagia Evaluation at [facility] upon the referral of her Neurologist, Dr. Cassidy Arevalo, to rule out aspiration, assess for diet texture change as appropriate, explore positional strategies, and/or compensatory techniques as necessary.  The physician ordered this evaluation to ensure that the patient meets her nutrition/hydration needs by mouth without compromising respiratory status.    \par \par HISTORY OF PRESENTING ILLNESS: Ms. Reyez arrived to today’s evaluation with her spouse, Pavan, who assisted in providing relevant case history information. The patient was awake, alert, and in no apparent distress throughout assessment. Per charting and patient report, the patient’s PMHx is significant for Parkinson’s and asthma. The patient is reporting intermittent swallowing difficulty marked by feelings of a globus sensation with solids, specifically dryer/denser solids, and occasional coughing during meals, when “going too fast,” which has been ongoing for the last couple of years. The patient reports a liquid wash assists with clearance of globus sensation of solids. The patient denies odynophagia, feelings of shortness of breath during or following meals, any recent PNAs, or unexpected weight loss. The patient further reports recent CT imaging was normal and per charting, CXR 23 revealed “No focal consolidation.”  \par \par CURRENT NUTRITIONAL INTAKE \par Solids: The patient reports she is consuming a regular solid diet at this time.  \par Liquids: The patient reports she is consuming thin liquids at this time. 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) \par Dermatitis (692.9) (L30.9) \par Encounter for removal of sutures (V58.32) (Z48.02) \par Neoplasm of uncertain behavior of skin (238.2) (D48.5) \par \par Past Medical History \par History of Exacerbation of asthma, unspecified asthma severity, unspecified whether \par persistent (493.92) (J45.901) \par History of arthritis (V13.4) (Z87.39) \par History of hay fever (V12.69) (Z87.09) \par History of neurological disease (V12.40) (Z86.69) \par History of osteoporosis (V13.59) (Z87.39) \par History of thyroid disorder (V12.29) (Z86.39) \par History of Oropharyngeal dysphagia (787.22) (R13.12) \par History of Parkinson's disease with EBS (electrical brain stimulation) (332.0) (G20) \par \par Medications were discussed and reviewed and can be found in the patient’s medical chart. \par Allergies were discussed and reviewed and can be found in the patient’s medical chart. \par \par  \par CLINICAL FINDINGS \par \par Oral Peripheral Assessment: \par Structures:    WFL \par Symmetry:    WFL                     \par Dentition:   Complete  \par Soft Palate:   WFL  \par Secretions: Patient managed her secretions without difficulty throughout today’s assessment.  \par Cognition/Communication: WFL \par Voice: WFL  \par \par Functions: Assessment of the labio-lingual and mandibular musculature revealed labial and mandibular strength and ROM noted to be WFL. \par \par Volitional Swallow: Upon clinician verbal instruction, the patient demonstrated timely pharyngeal swallow trigger and complete hyolaryngeal excursion.  \par \par  \par Consistencies Administered:   \par Solids:  Regular and pureed  \par Liquids:  Thin liquids via single cup sips and via straw \par \par Oral Stage:   \par The patient demonstrated a functional oral phase for pureed, regular solids, and thin liquids marked by adequate oral acceptance, slower mastication of solids; however overall functional and timely collection, transfer, and transport with adequate clearance post swallow.  \par \par Pharyngeal Stage: \par The patient demonstrated a judged functional pharyngeal phase for pureed, regular solids, and thin liquids marked by suspected timely pharyngeal swallow trigger and hyolaryngeal elevation noted by digital palpation without evidence of airway penetration/aspiration.  Of note, the patient denied any feelings of pharyngeal stasis or a globus sensation across all PO trials provided. \par \par  \par IMPRESSIONS: swallow function is grossly within functional limits \par \par PROGNOSIS:    Good for recommendations \par \par RECOMMENDATIONS:  \par 1. The patient was advised to consume a diet consisting of regular solids (avoid dry/denser solids) and thin liquids. \par 2. The patient was advised to consume small bites/sips at a decreased rate of consumption.  \par 3. The patient was advised to position herself upright (no <90 degrees) during all meals and for at least 30 minutes after.\par 4. An objective swallow study of a Modified Barium Swallow Study is recommended to further assess swallow mechanism. Swallowing therapy to be determined upon results and recommendations of Modified Barium Swallow Study.   \par 5. Follow up with referring physician, as directed \par \par EDUCATION: Verbal educational information and instruction was provided to the patient in regard to optimal eating strategies (i.e. small bites/sips and alternating solids with liquids at decreased rate of consumption) at the end of the evaluation. \par \par Should you have any additional concerns, please contact the Center at [phone] \par \par Blessing Ceja M.A. CCC-SLP \par Speech Language Pathologist \par [facility]

## 2023-09-11 NOTE — H&P ADULT - PROBLEM SELECTOR PROBLEM 4
Problem: Adult Inpatient Plan of Care  Goal: Plan of Care Review  Outcome: Ongoing (interventions implemented as appropriate)     05/28/19 1442   Plan of Care Review   Plan of Care Reviewed With patient   Patient aox4 vss in no visible distress. Fall precautions and skin precautions in place. Bed in lowest locked position call light within reach. Plan of care gone over with patient no further questions at this time. Will continue to monitor.     Comments: Patient aox4 vss in no visible distress. Fall precautions and skin precautions in place. Bed in lowest locked position call light within reach. Chair provided for OOB. Tamoxifen to be held per [facility]. Plan of care gone over with patient no further questions at this time. Will continue to monitor.    Prophylactic measure Hypothyroid Opt out

## 2023-10-05 NOTE — ED ADULT NURSE NOTE - ISOLATION TYPE:
Medicare Annual Wellness Questionnaire  This 68 year old year old female presents for a Medicare Wellness Exam.    The following is Jenn Aparicio's care team:  Patient Care Team         Relationship Specialty Notifications Start End    Mitzi Nettles APRN CNP PCP - General Nurse Practitioner  9/22/20     Phone: [phone] Fax: [phone]         [address] [facility] [address] [facility] [address]    Zarina Leung MD MD Internal Medicine-Hematology & Oncology Admissions 8/21/20     Phone: [phone] Fax: [phone]         [address] [facility] [address] [facility] [address]    Latesha Christianson, RN Clinic Care Coordinator   9/24/20     [facility]  Marti Christianson [phone]    Zarina Leung MD Assigned Cancer Care Provider   10/23/20     Phone: [phone] Fax: [phone]         [address] [facility] [address] [facility] [address]    Mitzi Nettles APRN CNP Assigned PCP   12/27/20     Phone: [phone] Fax: [phone]         [address] [facility] [address] [facility] [address]    Lynda Spann MD Fellow Pulmonary Disease  1/28/22     Phone: [phone] Fax: [phone]         [address] [facility] [address]    Earl Mcgrath [facility] Pharmacist Pharmacist Clinician- Clinical Pharmacy Specialist  6/1/22     Phone: [phone] Fax: [phone] [address] [address]    Travis Briseno MD Assigned Gastroenterology Provider   8/6/22     Phone: [phone] Fax: [phone]         [address] [facility] [address]    La Nena Shabazz MD MD Endocrinology, Diabetes, and Metabolism  9/1/22     Phone: [phone] Fax: [phone] [address] [facility] [address]    Nithya Narvaez, RN Diabetes Educator Diabetes Education  10/10/22     Phone: [phone] [address] [facility] [address]    Nithya Narvaez RN Diabetes Educator Diabetes Education  12/8/22     Phone: [phone] [address] [facility] [address]     La Nena Shabazz MD Assigned Endocrinology Provider   1/28/23     Phone: [phone] Fax: [phone]         [address] [place] [address]    Kush Santamaria MD MD Internal Medicine  2/27/23     Phone: [phone] Fax: [phone]         [address] [place] [address]    Kailee Moralez MD Resident Student in organized health care education/training program  2/28/23     Phone: [phone] Fax: [phone]         [address] [place] [address]    Gab Walden MD Hospitalist Internal Medicine  2/28/23     Referred to Pulm.    Phone: [phone] Pager: [phone] Fax: [phone]        [address] [place] [address]    Re Keita MD MD Ophthalmology  5/11/23     Phone: [phone] Fax: [phone]         [address] [place] [address]    Samy Prasad [facility] Assigned MTM Pharmacist   5/13/23     Phone: [phone] Fax: [phone]         [address] [place] [address]    Earl Ureña MD Assigned Pulmonology Provider   7/1/23     Phone: [phone] Fax: [phone]         [address] [place] [address]    Re Keita MD Assigned Surgical Provider   7/15/23     Phone: [phone] Fax: [phone]         [address] [place] [address]    Ana Davey, RN Specialty Care Coordinator Hematology & Oncology Admissions 7/27/23             Fall Risk Assessment:  Have you fallen 2 or more times in the last year? Yes   How many times were you injured due to a fall in the last year? 0    PHQ-2:  Over the last 2 weeks, how often have you been bothered by feeling down, depressed, or hopeless? Not at all (0)   Over the last 2 weeks, how often have you had little interest or pleasure in doing things? Not at all (0)     Social History:  What is your marital status?   Who lives in your household? Dog  Does your home have loose rugs in the hallway: Yes   Does your home have grab bars in the bathroom: No  Does  your home have handrails on the stairs? No  Does your home have poorly lit areas? No  Do you feel threatened or controlled by a partner, ex-partner or anyone in your life? No  Has anyone hurt you physically, for example by pushing, hitting, slapping or kicking you or forcing you to have sex? No  Do you need help with the phone, transportation, shopping, preparing meals, housework, laundry, medications or managing money? Yes     Sexual Health:  Are you sexually active? No  Have you had any sexually transmitted infections in the last year? No  Do you have any sexual concerns? No  Women: What year did you stop having periods (approximate age)? 20 years ago  Women: Any vaginal bleeding in the last year? No  Women: Have you ever had an abnormal Pap smear? No    General Health Assessment:  Have you noticed any hearing difficulties? No  Do you wear hearing aids? No  Have you seen a hearing professional such as an audiologist in the last 1 year? No  Do you have vision difficulty? No  Do you wear glasses or contacts? No  Have you seen an eye doctor in the last 1 year? Yes   How many servings of fruits and vegetables do you eat a day? 0  How often do you exercise in a week? Every day  How long and what kind of exercise do you do? Walking     Tobacco and Alcohol History:  Do you use tobacco/nicotine products? No  Do you use any other drugs? No  Do you drink alcohol? No    Advance Directive:  Have you completed an Advance Directives document? Yes   If yes, have you given a copy to the clinic? Yes   Do you need information on Advance Directives? Yes     ANDREY Disla at 3:35 PM on 10/5/2023.  Primary care clinic: [phone]       None

## 2024-01-18 NOTE — ED PROVIDER NOTE - INTERNATIONAL TRAVEL
----- Message from Rashaun Benedict sent at 1/18/2024  8:50 AM CST -----  Contact: Mom [phone]  a phone call.  Who left a message:  Mom   Do they know what this is regarding:  Mom is calling to get info for another group session at a different time possibly due to having issues with care for the sibling.  Would they like a phone call back or a response via [facility]:  call back     
Unable to Assess

## 2024-02-08 ENCOUNTER — NON-APPOINTMENT (OUTPATIENT)
Age: 74
End: 2024-02-08